# Patient Record
Sex: FEMALE | Race: BLACK OR AFRICAN AMERICAN | Employment: PART TIME | URBAN - METROPOLITAN AREA
[De-identification: names, ages, dates, MRNs, and addresses within clinical notes are randomized per-mention and may not be internally consistent; named-entity substitution may affect disease eponyms.]

---

## 2019-09-06 ENCOUNTER — OFFICE VISIT (OUTPATIENT)
Dept: PRIMARY CARE CLINIC | Age: 24
End: 2019-09-06

## 2019-09-06 VITALS
WEIGHT: 283 LBS | SYSTOLIC BLOOD PRESSURE: 129 MMHG | TEMPERATURE: 98 F | RESPIRATION RATE: 14 BRPM | HEIGHT: 71 IN | BODY MASS INDEX: 39.62 KG/M2 | DIASTOLIC BLOOD PRESSURE: 86 MMHG | OXYGEN SATURATION: 98 % | HEART RATE: 74 BPM

## 2019-09-06 DIAGNOSIS — E28.2 PCOS (POLYCYSTIC OVARIAN SYNDROME): ICD-10-CM

## 2019-09-06 DIAGNOSIS — N92.6 MISSED PERIODS: Primary | ICD-10-CM

## 2019-09-06 DIAGNOSIS — E66.9 OBESITY (BMI 30-39.9): ICD-10-CM

## 2019-09-06 LAB
HCG URINE, QL. (POC): NEGATIVE
VALID INTERNAL CONTROL?: YES

## 2019-09-06 RX ORDER — MEDROXYPROGESTERONE ACETATE 10 MG/1
TABLET ORAL
Refills: 0 | COMMUNITY
Start: 2019-08-30

## 2019-09-06 RX ORDER — METRONIDAZOLE 500 MG/1
TABLET ORAL
Refills: 1 | COMMUNITY
Start: 2019-08-30

## 2019-09-06 RX ORDER — METFORMIN HYDROCHLORIDE 500 MG/1
TABLET ORAL 2 TIMES DAILY WITH MEALS
COMMUNITY

## 2019-09-06 NOTE — PROGRESS NOTES
Written by Negro Moya, as dictated by Dr. Indigo Lester MD.    Karyle Jaegers is a 21 y.o. female. HPI  The patient comes in today to establish care. Previously under the care of Joshua Reis NP. Records in [address] [place] [address] reviewed. She was referred by Dr. Bing Euceda . Pt is accompanied by a child of her friend. She notes that she was borderline diabetic, and was told to continue Metformin 500 mg 2 tabs daily, watch her diet and exercise. She reports that she has lost 15-18 lbs. She is doing low carbohydrate diet and going to the gym. She notes that she has a small pain in her back, which has kept her from exercising this week, but she plans on starting up again. She is taking Provera 10 mg. She reports her last period was July 12th, which lasted 2 weeks. She notes that her period has not returned. She knows that her periods are irregular, but has not been officially diagnosed with PCOS. She would like a pregnancy test. Her last pap smear was 06/2019. Followed by Dr. Bing Euceda (OB/GYN). She does not receive flu shots annually, as she notes that she does not get sick. She does not recall when her last Tdap was. She does not recall if she had the HPV vaccine. She works as pre-. Patient Active Problem List   Diagnosis Code    PCOS (polycystic ovarian syndrome) E28.2    Obesity (BMI 30-39. 9) E66.9        Current Outpatient Medications on File Prior to Visit   Medication Sig Dispense Refill    metFORMIN (GLUCOPHAGE) 500 mg tablet Take  by mouth two (2) times daily (with meals).  metroNIDAZOLE (FLAGYL) 500 mg tablet TAKE 1 TABLET BY MOUTH EVERY 12 HOURS  1    medroxyPROGESTERone (PROVERA) 10 mg tablet TAKE 1 TABLET BY MOUTH EVERY DAY FOR 10 DAYS  0     No current facility-administered medications on file prior to visit.         Past Medical History:   Diagnosis Date    Diabetes [facility])        Social History     Socioeconomic History    Marital status: SINGLE     Spouse name: Not on file    Number of children: Not on file    Years of education: Not on file    Highest education level: Not on file   Occupational History    Not on file   Social Needs    Financial resource strain: Not on file    Food insecurity:     Worry: Not on file     Inability: Not on file    Transportation needs:     Medical: Not on file     Non-medical: Not on file   Tobacco Use    Smoking status: Never Smoker    Smokeless tobacco: Never Used   Substance and Sexual Activity    Alcohol use: No    Drug use: Never    Sexual activity: Not Currently   Lifestyle    Physical activity:     Days per week: Not on file     Minutes per session: Not on file    Stress: Not on file   Relationships    Social connections:     Talks on phone: Not on file     Gets together: Not on file     Attends Che[employer] service: Not on file     Active member of club or organization: Not on file     Attends meetings of clubs or organizations: Not on file     Relationship status: Not on file    Intimate partner violence:     Fear of current or ex partner: Not on file     Emotionally abused: Not on file     Physically abused: Not on file     Forced sexual activity: Not on file   Other Topics Concern    Not on file   Social History Narrative    Not on file       Review of Systems   Constitutional: Positive for weight loss (intentional). Negative for malaise/fatigue. Respiratory: Negative for shortness of breath. Musculoskeletal: Negative for joint pain and myalgias. Neurological: Negative for dizziness and headaches. Psychiatric/Behavioral: Negative for depression and substance abuse. The patient is not nervous/anxious and does not have insomnia.       Visit Vitals  /86 (BP 1 Location: Right arm, BP Patient Position: Sitting)   Pulse 74   Temp 98 °F (36.7 °C) (Oral)   Resp 14   Ht 5' 11\" (1.803 m)   Wt 283 lb (128.4 kg)   LMP 07/12/2019 (Exact Date)   SpO2 98%   BMI 39.47 kg/m² Physical Exam   Constitutional: She is oriented to person, place, and time. She appears well-developed. No distress. obese   HENT:   Head: Normocephalic and atraumatic. Right Ear: External ear normal.   Left Ear: External ear normal.   Eyes: Conjunctivae are normal. Right eye exhibits no discharge. Left eye exhibits no discharge. Neck: Normal range of motion. Neck supple. Cardiovascular: Normal rate, regular rhythm and normal heart sounds. Exam reveals no gallop and no friction rub. No murmur heard. Pulmonary/Chest: Effort normal and breath sounds normal. She has no wheezes. Abdominal: Soft. Bowel sounds are normal. There is no tenderness. Musculoskeletal: Normal range of motion. Neurological: She is alert and oriented to person, place, and time. She has normal reflexes. Skin: She is not diaphoretic. Psychiatric: She has a normal mood and affect. Her behavior is normal. Thought content normal.   Nursing note and vitals reviewed. ASSESSMENT and PLAN    ICD-10-CM ICD-9-CM    1. Missed periods N92.6 626.4 AMB POC URINE PREGNANCY TEST, VISUAL COLOR COMPARISON    POC urine pregnancy test was negative. 2. PCOS (polycystic ovarian syndrome) E28.2 256.4 Followed by OB/GYN. Advised pt that Metformin helps with the weight and PCOS. 3. Obesity (BMI 30-39. 9) E66.9 278.00 Commended on weight loss. Advised pt that Metformin helps with the weight and PCOS. Followed by Dr. Josh Fragoso (OB/GYN). This plan was reviewed with the patient and patient agrees. All questions were answered. This scribe documentation was reviewed by me and accurately reflects the examination and decisions made by me. This note will not be viewable in [address]